# Patient Record
Sex: FEMALE | Race: WHITE | NOT HISPANIC OR LATINO | URBAN - METROPOLITAN AREA
[De-identification: names, ages, dates, MRNs, and addresses within clinical notes are randomized per-mention and may not be internally consistent; named-entity substitution may affect disease eponyms.]

---

## 2023-10-04 PROBLEM — R21 RASH AND OTHER NONSPECIFIC SKIN ERUPTION: Status: ACTIVE | Noted: 2022-08-30

## 2023-10-04 PROBLEM — D18.01 HEMANGIOMA OF SKIN AND SUBCUTANEOUS TISSUE: Status: ACTIVE | Noted: 2022-08-30

## 2023-10-04 PROBLEM — D22.61 MELANOCYTIC NEVI OF RIGHT UPPER LIMB, INCLUDING SHOULDER: Status: ACTIVE | Noted: 2022-08-30

## 2023-10-04 PROBLEM — L72.3 SEBACEOUS CYST: Status: ACTIVE | Noted: 2022-08-30

## 2023-10-04 PROBLEM — L72.0 EPIDERMAL CYST: Status: ACTIVE | Noted: 2022-08-30

## 2023-10-04 PROBLEM — L82.0 INFLAMED SEBORRHEIC KERATOSIS: Status: ACTIVE | Noted: 2022-08-30

## 2023-10-04 PROBLEM — L81.4 OTHER MELANIN HYPERPIGMENTATION: Status: ACTIVE | Noted: 2022-08-30

## 2023-10-04 PROBLEM — D22.5 MELANOCYTIC NEVI OF TRUNK: Status: ACTIVE | Noted: 2022-08-30

## 2023-10-04 PROBLEM — L82.1 OTHER SEBORRHEIC KERATOSIS: Status: ACTIVE | Noted: 2022-08-30

## 2023-10-04 PROBLEM — D22.39 MELANOCYTIC NEVI OF OTHER PARTS OF FACE: Status: ACTIVE | Noted: 2022-08-30

## 2023-10-04 PROBLEM — D22.72 MELANOCYTIC NEVI OF LEFT LOWER LIMB, INCLUDING HIP: Status: ACTIVE | Noted: 2022-08-30

## 2023-10-04 PROBLEM — D22.62 MELANOCYTIC NEVI OF LEFT UPPER LIMB, INCLUDING SHOULDER: Status: ACTIVE | Noted: 2022-08-30

## 2023-10-04 PROBLEM — D22.71 MELANOCYTIC NEVI OF RIGHT LOWER LIMB, INCLUDING HIP: Status: ACTIVE | Noted: 2022-08-30

## 2023-10-04 PROBLEM — L40.0 PSORIASIS VULGARIS: Status: ACTIVE | Noted: 2022-08-30

## 2023-10-04 PROBLEM — L40.8 OTHER PSORIASIS: Status: ACTIVE | Noted: 2022-08-30

## 2023-10-04 RX ORDER — KETOCONAZOLE 20 MG/G
1 CREAM TOPICAL
COMMUNITY
Start: 2020-08-25

## 2023-10-04 RX ORDER — FLUOCINOLONE ACETONIDE 0.11 MG/ML
1 OIL TOPICAL
COMMUNITY
Start: 2021-08-30

## 2023-10-04 RX ORDER — TRIAMCINOLONE ACETONIDE 1 MG/G
1 CREAM TOPICAL
COMMUNITY
Start: 2020-06-26

## 2023-10-04 RX ORDER — MICONAZOLE NITRATE 2 %
1 POWDER (GRAM) TOPICAL
COMMUNITY
Start: 2020-08-25

## 2023-10-04 RX ORDER — HYDROCORTISONE 25 MG/G
1 OINTMENT TOPICAL
COMMUNITY
Start: 2019-11-02

## 2023-10-04 RX ORDER — FLUOCINOLONE ACETONIDE 0.11 MG/ML
OIL AURICULAR (OTIC)
COMMUNITY
Start: 2021-08-30

## 2023-10-04 RX ORDER — DOXYCYCLINE 100 MG/1
100 CAPSULE ORAL
COMMUNITY
Start: 2020-08-25

## 2023-10-04 RX ORDER — FLUOCINOLONE ACETONIDE 0.1 MG/ML
1 SOLUTION TOPICAL
COMMUNITY
Start: 2019-11-07

## 2023-10-09 ENCOUNTER — OFFICE VISIT (OUTPATIENT)
Dept: DERMATOLOGY | Facility: CLINIC | Age: 53
End: 2023-10-09
Payer: COMMERCIAL

## 2023-10-09 DIAGNOSIS — L81.4 LENTIGO: ICD-10-CM

## 2023-10-09 DIAGNOSIS — D18.01 HEMANGIOMA OF SKIN: ICD-10-CM

## 2023-10-09 DIAGNOSIS — Z80.8 FAMILY HISTORY OF SKIN CANCER: ICD-10-CM

## 2023-10-09 DIAGNOSIS — D22.9 MULTIPLE BENIGN NEVI: ICD-10-CM

## 2023-10-09 DIAGNOSIS — L82.1 SEBORRHEIC KERATOSIS: ICD-10-CM

## 2023-10-09 DIAGNOSIS — L40.9 PSORIASIS: Primary | ICD-10-CM

## 2023-10-09 PROCEDURE — 99213 OFFICE O/P EST LOW 20 MIN: CPT | Performed by: DERMATOLOGY

## 2023-10-09 RX ORDER — PANCRELIPASE 24000; 76000; 120000 [USP'U]/1; [USP'U]/1; [USP'U]/1
CAPSULE, DELAYED RELEASE PELLETS ORAL
COMMUNITY

## 2023-10-09 RX ORDER — LOSARTAN POTASSIUM 25 MG/1
12.5 TABLET ORAL DAILY
COMMUNITY

## 2023-10-09 RX ORDER — ESTRADIOL 0.1 MG/G
CREAM VAGINAL
COMMUNITY
Start: 2023-05-27

## 2023-10-09 RX ORDER — FLUCONAZOLE 200 MG/1
TABLET ORAL
COMMUNITY
Start: 2020-05-05

## 2023-10-09 RX ORDER — METOPROLOL TARTRATE 25 MG/1
TABLET, FILM COATED ORAL
COMMUNITY

## 2023-10-09 RX ORDER — ACETAMINOPHEN 500 MG
2000 TABLET ORAL DAILY
COMMUNITY

## 2023-10-09 RX ORDER — LORAZEPAM 0.5 MG/1
0.5 TABLET ORAL 2 TIMES DAILY
COMMUNITY

## 2023-10-09 RX ORDER — OMEPRAZOLE 20 MG/1
20 CAPSULE, DELAYED RELEASE ORAL 2 TIMES DAILY
COMMUNITY

## 2023-10-09 RX ORDER — CRANBERRY FRUIT EXTRACT 12:1
POWDER (GRAM) MISCELLANEOUS
COMMUNITY

## 2023-10-09 RX ORDER — PREDNISONE 5 MG/1
8 TABLET ORAL DAILY
COMMUNITY

## 2023-10-09 NOTE — PROGRESS NOTES
Subjective     Tiff Haines is a 53 y.o. female who presents for the following: Skin Check (Pt reports family history non melanoma skin cancer. ).     Review of Systems:  No other skin or systemic complaints other than what is documented elsewhere in the note.    The following portions of the chart were reviewed this encounter and updated as appropriate:  Allergies  Meds  Problems  Med Hx  Surg Hx  Fam Hx         Skin Cancer History  No skin cancer on file.      Specialty Problems          Dermatology Problems    Epidermal cyst    Hemangioma of skin and subcutaneous tissue    Inflamed seborrheic keratosis    Melanocytic nevi of left lower limb, including hip    Melanocytic nevi of left upper limb, including shoulder    Melanocytic nevi of other parts of face    Melanocytic nevi of right lower limb, including hip    Melanocytic nevi of right upper limb, including shoulder    Melanocytic nevi of trunk    Other melanin hyperpigmentation    Other psoriasis    Other seborrheic keratosis    Psoriasis vulgaris    Rash and other nonspecific skin eruption    Sebaceous cyst        Objective     Well appearing patient in no apparent distress; mood and affect are within normal limits.    A full examination was performed including face, neck, chest, abdomen, back, bilateral upper extremities, bilateral lower extremities. Patient declines exam of the scalp. Patient declines exam underneath the underwear; patient declines exam of the lower abdomen/mons pubis, buttocks, groin, genitalia, perineal and perianal skin and these areas were not examined.      All findings within normal limits unless otherwise noted below.    Assessment/Plan   1. Psoriasis  Left Ear, Right Ear, Scalp  Erythematous papule(s)/plaque(s) with micaceous scale     -Patient with PsO, PsA, and Crohn's  -Patient discontinued biologics due to side effect complaints in 2020, and notes that psoriasis has improved since that time.  -Patient declines refills  of any topicals at this time.    2. Multiple benign nevi  Brown and tan macules and papules with reassuring findings on dermoscopy    -These lesions have benign, reassuring patterns on dermoscopy  -Recommend continued self observation, and to contact the office if any changes in nevi are noticed    Related Procedures  Follow Up In Dermatology - Established Patient    3. Lentigo  Tan macules    -Benign appearing on exam  -Reassurance, recommend observation    4. Seborrheic keratosis  Stuck on, waxy macule(s)/papule(s)/plaque(s) with comedo-like openings and milia like cysts    -Discussed the nature of the diagnosis  -Reassurance, recommend continued observation    5. Hemangioma of skin  Cherry red papules    -Discussed the nature of the diagnosis  -Reassurance, recommend continued observation    6. Family history of skin cancer        Discussed/information given on safe sun practices and use of sunscreen, sun protective clothing or sun avoidance. Recommend to use over the counter medication of sunscreen with a SPF 30 or higher on a daily basis prior to sun exposure to reduce the risk of skin cancer.    Follow up in 1 year for FSE  Discussed if there are any changes or development of concerning symptoms (lesion/skin condition is changing, bleeding, enlarging, or worsening) the patient is to contact my office. The patient verbalizes understanding.     Brooklyn Donovan MD  10/9/2023

## 2024-02-08 ENCOUNTER — OFFICE VISIT (OUTPATIENT)
Dept: DERMATOLOGY | Facility: CLINIC | Age: 54
End: 2024-02-08
Payer: COMMERCIAL

## 2024-02-08 DIAGNOSIS — D22.9 MELANOCYTIC NEVUS, UNSPECIFIED LOCATION: ICD-10-CM

## 2024-02-08 DIAGNOSIS — D18.01 HEMANGIOMA OF SKIN: ICD-10-CM

## 2024-02-08 DIAGNOSIS — L91.8 SKIN TAG: ICD-10-CM

## 2024-02-08 DIAGNOSIS — L82.1 SEBORRHEIC KERATOSIS: ICD-10-CM

## 2024-02-08 DIAGNOSIS — L81.4 LENTIGO: ICD-10-CM

## 2024-02-08 DIAGNOSIS — Z12.83 ENCOUNTER FOR SCREENING FOR MALIGNANT NEOPLASM OF SKIN: Primary | ICD-10-CM

## 2024-02-08 PROCEDURE — 99213 OFFICE O/P EST LOW 20 MIN: CPT | Performed by: NURSE PRACTITIONER

## 2024-02-08 NOTE — PROGRESS NOTES
"Subjective     Tiff aHines is a 53 y.o. female who presents for the following: Skin Check (\"Discolored\" lesion to thigh, changed since last visit in October. Denies pain, itching or bleeding. No further complaints.).     Review of Systems:  No other skin or systemic complaints other than what is documented elsewhere in the note.    The following portions of the chart were reviewed this encounter and updated as appropriate:  Tobacco  Allergies  Meds  Problems  Med Hx  Surg Hx  Fam Hx         Skin Cancer History  No skin cancer on file.      Specialty Problems          Dermatology Problems    Epidermal cyst    Hemangioma of skin and subcutaneous tissue    Inflamed seborrheic keratosis    Melanocytic nevi of left lower limb, including hip    Melanocytic nevi of left upper limb, including shoulder    Melanocytic nevi of other parts of face    Melanocytic nevi of right lower limb, including hip    Melanocytic nevi of right upper limb, including shoulder    Melanocytic nevi of trunk    Other melanin hyperpigmentation    Other psoriasis    Other seborrheic keratosis    Psoriasis vulgaris    Rash and other nonspecific skin eruption    Sebaceous cyst        Objective   Well appearing patient in no apparent distress; mood and affect are within normal limits.    A full examination was performed including scalp, head, eyes, ears, nose, lips, neck, chest, axillae, abdomen, back, buttocks, bilateral upper extremities, bilateral lower extremities, hands, feet, fingers, toes, fingernails, and toenails. All findings within normal limits unless otherwise noted below.    Assessment/Plan   1. Encounter for screening for malignant neoplasm of skin  Scattered benign lesions    - Protective measures, such as avoiding skin exposure to sunlight during peak sun hours (10 AM to 3 PM), wearing protective clothing, and applying high-SPF sunscreen, are essential for reducing exposure to harmful ultraviolet (UV) light.  - Monthly " self-examination of the skin is helpful to detect new lesions or changes in existing lesions.  - Discussed signs and symptoms of sun-related skin cancers.   - Make sure your moles are not signs of skin cancer (melanoma). Remember the ABCDEs of melanoma lesions:  A - Asymmetry: One half of the lesion does not mirror the other half.  B - Border: The borders are irregular or vague (indistinct).  C - Color: More than one color may be noted within the mole.  D - Diameter: Size greater than 6 mm (roughly the size of a pencil eraser) may be concerning.  E - Evolving: Notable changes in the lesion over time are suspicious signs for skin cancer.    2. Hemangioma of skin  Violaceous/red papule with maroon lagoons     - A cherry hemangioma is a small macule (small, flat, smooth area) or papule (small, solid bump) formed from an overgrowth of tiny blood vessels in the skin. Cherry hemangiomas are characteristically red or purplish in color. They often first appear in middle adulthood and usually increase in number with age. Cherry hemangiomas are noncancerous (benign) and are common in adults.  - Lesions are benign, reassured patient.     3. Seborrheic keratosis  Stuck on verrucous, tan-brown papules and plaques.      Although Seborrheic Keratoses can be troublesome and unsightly, they are entirely benign.  Removal of Seborrheic Keratoses is considered a cosmetic procedure. Removal is typically performed using liquid nitrogen cryotherapy.  Treatment of current lesions does not prevent the development of new Seborrheic Keratoses in the future.    4. Lentigo  Scattered tan macules in sun-exposed areas.    A solar lentigo (plural, solar lentigines), sometimes called an age spot or liver spot, is a brown macule (small, flat, smooth area of skin) caused by chronic sun or artificial ultraviolet (UV) light exposure. There may be just one lentigo or there may be multiple. This type of lentigo is different from lentigo simplex  (discussed separately) because it is caused by exposure to UV light. Solar lentigines are benign, but they do indicate excessive sun exposure, a risk factor for the development of skin cancer.  Lesions are benign, no treatment needed.     5. Melanocytic nevus, unspecified location  Uniform pigmented macule(s)/papule(s) with reassuring findings on dermoscopy    -Discussed nature of condition  -Reassurance, benign-appearing features on examination today  -Recommend continued observation    6. Skin tag  Fleshy, skin-colored sessile and pedunculated papules.     Acrochordon (skin tag)  - The benign nature of the lesion was discussed with patient.   - A skin tag (acrochordon) is a common, possibly inherited condition that manifests as small, flesh-colored growths on a thin stalk. Skin tags are benign lesions that can sometimes become irritated or traumatized.  - Skin tags are very common, and their incidence increases with age. Seen more often in people with growth hormone excess (acromegaly).   - Skin tags are most commonly found on the eyelids, neck, armpits, and groin area. They are flesh-colored growths on a thin stalk, ranging in size from small to large.  - Will start PA process for skin tag removal.

## 2024-02-13 ENCOUNTER — TELEPHONE (OUTPATIENT)
Dept: DERMATOLOGY | Facility: CLINIC | Age: 54
End: 2024-02-13
Payer: COMMERCIAL

## 2024-02-13 NOTE — TELEPHONE ENCOUNTER
Pt left VM requesting to schedule appointment for skin tag removal. PA for skin tag removal has not been approved yet.     Nathalie Chandra RN

## 2024-02-16 ENCOUNTER — TELEPHONE (OUTPATIENT)
Dept: DERMATOLOGY | Facility: CLINIC | Age: 54
End: 2024-02-16
Payer: COMMERCIAL

## 2024-02-16 NOTE — TELEPHONE ENCOUNTER
Pt contacted office at this time and states that the insurance has notified her that the procedure for the skin tag removal is approved.  Pt is ok to be scheduled.     Can someone please reach out to patient to schedule for skin tag removal with Sue Mayne CNP.    Nj Valentin LPN

## 2024-03-19 ENCOUNTER — APPOINTMENT (OUTPATIENT)
Dept: DERMATOLOGY | Facility: CLINIC | Age: 54
End: 2024-03-19
Payer: COMMERCIAL

## 2024-03-19 ENCOUNTER — OFFICE VISIT (OUTPATIENT)
Dept: DERMATOLOGY | Facility: CLINIC | Age: 54
End: 2024-03-19
Payer: COMMERCIAL

## 2024-03-19 DIAGNOSIS — L91.8 SKIN TAG: ICD-10-CM

## 2024-03-19 DIAGNOSIS — R52 PAIN: Primary | ICD-10-CM

## 2024-03-19 PROCEDURE — 11200 RMVL SKIN TAGS UP TO&INC 15: CPT | Performed by: NURSE PRACTITIONER

## 2024-03-19 NOTE — PROGRESS NOTES
Subjective     Tiff Haines is a 53 y.o. female who presents for the following: Suspicious Skin Lesion (Tags to axillae.).     Review of Systems:  No other skin or systemic complaints other than what is documented elsewhere in the note.    The following portions of the chart were reviewed this encounter and updated as appropriate:   Tobacco  Allergies  Meds  Problems  Med Hx  Surg Hx  Fam Hx         Skin Cancer History  No skin cancer on file.      Specialty Problems          Dermatology Problems    Epidermal cyst    Hemangioma of skin and subcutaneous tissue    Inflamed seborrheic keratosis    Melanocytic nevi of left lower limb, including hip    Melanocytic nevi of left upper limb, including shoulder    Melanocytic nevi of other parts of face    Melanocytic nevi of right lower limb, including hip    Melanocytic nevi of right upper limb, including shoulder    Melanocytic nevi of trunk    Other melanin hyperpigmentation    Other psoriasis    Other seborrheic keratosis    Psoriasis vulgaris    Rash and other nonspecific skin eruption    Sebaceous cyst        Objective   Well appearing patient in no apparent distress; mood and affect are within normal limits.    A focused skin examination was performed. All findings within normal limits unless otherwise noted below.    Assessment/Plan   1. Pain    2. Skin tag (2)  Left Axilla, Right Axilla  Fleshy, skin-colored sessile and pedunculated papules.     Acrochordon (skin tag)  - The benign nature of the lesion was discussed with patient.   - A skin tag (acrochordon) is a common, possibly inherited condition that manifests as small, flesh-colored growths on a thin stalk. Skin tags are benign lesions that can sometimes become irritated or traumatized.  - Skin tags are very common, and their incidence increases with age. Seen more often in people with growth hormone excess (acromegaly).   - Skin tags are most commonly found on the eyelids, neck, armpits, and groin  area. They are flesh-colored growths on a thin stalk, ranging in size from small to large.      Related Procedures  Prior Authorization for Skin Excision - Skin Tag Removal

## 2024-03-26 ENCOUNTER — APPOINTMENT (OUTPATIENT)
Dept: DERMATOLOGY | Facility: CLINIC | Age: 54
End: 2024-03-26
Payer: COMMERCIAL

## 2024-06-25 ENCOUNTER — APPOINTMENT (OUTPATIENT)
Dept: DERMATOLOGY | Facility: CLINIC | Age: 54
End: 2024-06-25
Payer: COMMERCIAL

## 2024-06-25 DIAGNOSIS — L82.1 SEBORRHEIC KERATOSIS: Primary | ICD-10-CM

## 2024-06-25 PROCEDURE — 11200 RMVL SKIN TAGS UP TO&INC 15: CPT | Performed by: NURSE PRACTITIONER

## 2024-06-25 NOTE — PROGRESS NOTES
Subjective   Tiff Haines is a 53 y.o. female who presents for the following: Skin tags. PA not required. Request re-examination of axillae, plus tags to bilateral breasts and left inner thigh.       Objective   Well appearing patient in no apparent distress; mood and affect are within normal limits.    A focused examination was performed including head, including the scalp, face, neck, nose, ears, eyelids, and lips. All findings within normal limits unless otherwise noted below.    Left Inframammary Fold, Neck - Anterior, Right Inframammary Fold  Stuck on verrucous, tan-brown papules and plaques.        Assessment/Plan   Seborrheic keratosis (3)  Neck - Anterior; Left Inframammary Fold; Right Inframammary Fold    Although Seborrheic Keratoses can be troublesome and unsightly, they are entirely benign.  Removal of Seborrheic Keratoses is considered a cosmetic procedure. Removal is typically performed using liquid nitrogen cryotherapy.  Treatment of current lesions does not prevent the development of new Seborrheic Keratoses in the future.  -Discussed nature of condition  -Multiple treatments in office are often needed  -Diligent at home therapy is often needed  -Cryotherapy today  -Possible side effects of liquid nitrogen treatment reviewed including formation of blisters, crusting, tenderness, scar, and discoloration which may be permanent.  -Patient advised to return the office for re-evaluation if the treated lesion(s) do not resolve within 4-6 weeks. Patient verbalizes understanding.

## 2024-08-14 ENCOUNTER — APPOINTMENT (OUTPATIENT)
Dept: DERMATOLOGY | Facility: CLINIC | Age: 54
End: 2024-08-14
Payer: COMMERCIAL

## 2024-10-14 ENCOUNTER — APPOINTMENT (OUTPATIENT)
Dept: DERMATOLOGY | Facility: CLINIC | Age: 54
End: 2024-10-14
Payer: COMMERCIAL

## 2024-10-15 ENCOUNTER — APPOINTMENT (OUTPATIENT)
Dept: DERMATOLOGY | Facility: CLINIC | Age: 54
End: 2024-10-15
Payer: COMMERCIAL

## 2024-10-18 ENCOUNTER — APPOINTMENT (OUTPATIENT)
Dept: DERMATOLOGY | Facility: CLINIC | Age: 54
End: 2024-10-18
Payer: COMMERCIAL

## 2024-10-18 DIAGNOSIS — L82.1 SEBORRHEIC KERATOSIS: ICD-10-CM

## 2024-10-18 DIAGNOSIS — L81.4 LENTIGO: ICD-10-CM

## 2024-10-18 DIAGNOSIS — D18.01 HEMANGIOMA OF SKIN: ICD-10-CM

## 2024-10-18 DIAGNOSIS — Z12.83 ENCOUNTER FOR SCREENING FOR MALIGNANT NEOPLASM OF SKIN: Primary | ICD-10-CM

## 2024-10-18 DIAGNOSIS — D22.9 MULTIPLE BENIGN NEVI: ICD-10-CM

## 2024-10-18 DIAGNOSIS — D22.9 MELANOCYTIC NEVUS, UNSPECIFIED LOCATION: ICD-10-CM

## 2024-10-18 PROCEDURE — 99213 OFFICE O/P EST LOW 20 MIN: CPT | Performed by: NURSE PRACTITIONER

## 2024-10-18 ASSESSMENT — ITCH NUMERIC RATING SCALE: HOW SEVERE IS YOUR ITCHING?: 0

## 2024-10-18 NOTE — PROGRESS NOTES
Subjective     Tiff Haines is a 54 y.o. female who presents for the following: Skin Check (FBSE).     Review of Systems:  No other skin or systemic complaints other than what is documented elsewhere in the note.    The following portions of the chart were reviewed this encounter and updated as appropriate:   Tobacco  Allergies  Meds  Problems  Med Hx  Surg Hx  Fam Hx         Skin Cancer History  No skin cancer on file.      Specialty Problems          Dermatology Problems    Epidermal cyst    Hemangioma of skin and subcutaneous tissue    Inflamed seborrheic keratosis    Melanocytic nevi of left lower limb, including hip    Melanocytic nevi of left upper limb, including shoulder    Melanocytic nevi of other parts of face    Melanocytic nevi of right lower limb, including hip    Melanocytic nevi of right upper limb, including shoulder    Melanocytic nevi of trunk    Other melanin hyperpigmentation    Other psoriasis    Other seborrheic keratosis    Psoriasis vulgaris    Rash and other nonspecific skin eruption    Sebaceous cyst        Objective   Well appearing patient in no apparent distress; mood and affect are within normal limits.    A focused skin examination was performed. All findings within normal limits unless otherwise noted below.    Assessment/Plan   1. Encounter for screening for malignant neoplasm of skin  Scattered benign lesions    - Protective measures, such as avoiding skin exposure to sunlight during peak sun hours (10 AM to 3 PM), wearing protective clothing, and applying high-SPF sunscreen, are essential for reducing exposure to harmful ultraviolet (UV) light.  - Monthly self-examination of the skin is helpful to detect new lesions or changes in existing lesions.  - Discussed signs and symptoms of sun-related skin cancers.   - Make sure your moles are not signs of skin cancer (melanoma). Remember the ABCDEs of melanoma lesions:  A - Asymmetry: One half of the lesion does not mirror the  other half.  B - Border: The borders are irregular or vague (indistinct).  C - Color: More than one color may be noted within the mole.  D - Diameter: Size greater than 6 mm (roughly the size of a pencil eraser) may be concerning.  E - Evolving: Notable changes in the lesion over time are suspicious signs for skin cancer.    2. Multiple benign nevi    Related Procedures  Follow Up In Dermatology - Established Patient    3. Melanocytic nevus, unspecified location  Uniform pigmented macule(s)/papule(s) with reassuring findings on dermoscopy    -Discussed nature of condition  -Reassurance, benign-appearing features on examination today  -Recommend continued observation    4. Seborrheic keratosis  Stuck on verrucous, tan-brown papules and plaques.      Although Seborrheic Keratoses can be troublesome and unsightly, they are entirely benign.  Removal of Seborrheic Keratoses is considered a cosmetic procedure. Removal is typically performed using liquid nitrogen cryotherapy.  Treatment of current lesions does not prevent the development of new Seborrheic Keratoses in the future.    5. Hemangioma of skin  Violaceous/red papule with maroon lagoons     - A cherry hemangioma is a small macule (small, flat, smooth area) or papule (small, solid bump) formed from an overgrowth of tiny blood vessels in the skin. Cherry hemangiomas are characteristically red or purplish in color. They often first appear in middle adulthood and usually increase in number with age. Cherry hemangiomas are noncancerous (benign) and are common in adults.  - Lesions are benign, reassured patient.     6. Lentigo  Scattered tan macules in sun-exposed areas.    A solar lentigo (plural, solar lentigines), sometimes called an age spot or liver spot, is a brown macule (small, flat, smooth area of skin) caused by chronic sun or artificial ultraviolet (UV) light exposure. There may be just one lentigo or there may be multiple. This type of lentigo is different  from lentigo simplex (discussed separately) because it is caused by exposure to UV light. Solar lentigines are benign, but they do indicate excessive sun exposure, a risk factor for the development of skin cancer.  Lesions are benign, no treatment needed.     Follow up in 12 months for a total body skin exam. Please call me if there are any changes or development of concerning symptoms (lesion/skin condition is changing, bleeding, enlarging, or worsening).

## 2024-11-22 ENCOUNTER — APPOINTMENT (OUTPATIENT)
Dept: DERMATOLOGY | Facility: CLINIC | Age: 54
End: 2024-11-22
Payer: COMMERCIAL

## 2025-10-20 ENCOUNTER — APPOINTMENT (OUTPATIENT)
Dept: DERMATOLOGY | Facility: CLINIC | Age: 55
End: 2025-10-20
Payer: COMMERCIAL